# Patient Record
Sex: FEMALE | Race: WHITE | NOT HISPANIC OR LATINO | Employment: UNEMPLOYED | URBAN - METROPOLITAN AREA
[De-identification: names, ages, dates, MRNs, and addresses within clinical notes are randomized per-mention and may not be internally consistent; named-entity substitution may affect disease eponyms.]

---

## 2021-08-19 ENCOUNTER — HISTORICAL (OUTPATIENT)
Dept: ADMINISTRATIVE | Facility: HOSPITAL | Age: 21
End: 2021-08-19

## 2022-10-12 PROBLEM — Z30.2 ADMISSION FOR STERILIZATION: Status: ACTIVE | Noted: 2022-10-12

## 2022-10-12 RX ORDER — LORATADINE 10 MG/1
10 TABLET ORAL DAILY
COMMUNITY
End: 2023-10-31

## 2022-10-12 NOTE — H&P
History & Physical  Gynecology    SUBJECTIVE:     Chief Complaint/Reason for Admission: Laparoscopic Bilateral Tubal Fulguration    History of Present Illness:  Patient is a 22 y.o.  who presents with undesired future fertility and desires permanent sterilization.     No medications prior to admission.       Review of patient's allergies indicates:  Not on File    Past medical history:elevated BMI and depression    Past surgical history:, T&A, and left patellar repair.        Review of Systems:  Constitutional: no fever or chills  Respiratory: no cough or shortness of breath  Cardiovascular: no chest pain or palpitations  Genitourinary: no hematuria or dysuria  Neuro:No headaches, dizziness or blurry vision.     OBJECTIVE:     Vital Signs (Most Recent):       Physical Exam:  General: well developed, well nourished  Lungs:  clear to auscultation bilaterally and normal respiratory effort  Cardiovascular: Heart: regular rate and rhythm. Chest Wall: no tenderness. ABD: obese w/o masses. Extremities: no cyanosis or edema, or clubbing. Pulses: 2+ and symmetric. Neuro: cranial nerves 2-12 grossly intact.  Pelvic:   NFEG w/o lesions. Small mobile uterus, no adx masses    Laboratory:  No results found for: WBC, HGB, HCT, MCV, PLT    Ultrasound:  No results found for this or any previous visit.          ASSESSMENT/PLAN:     Active Hospital Problems    Diagnosis  POA    Admission for sterilization [Z30.2]  Not Applicable      Resolved Hospital Problems   No resolved problems to display.       To OR for LBTF    Preop labs and studies ordered.    NPO 8 hours prior to surgery.     Medications reviewed.     Risks and benefits explained in detail to patient, including but not limited to damage to internal organs, including but not limited to bowel, bladder, uterus, tubes, ovaries, nerves, arteries, veins, possible need for blood transfusion. Patient is aware of all risks and desires surgery. All questions  answered. Consents signed.

## 2022-10-13 ENCOUNTER — APPOINTMENT (OUTPATIENT)
Dept: LAB | Facility: HOSPITAL | Age: 22
End: 2022-10-13
Attending: OBSTETRICS & GYNECOLOGY
Payer: MEDICAID

## 2022-10-13 DIAGNOSIS — Z01.818 PRE-OP TESTING: ICD-10-CM

## 2022-10-13 LAB — SARS-COV-2 RNA RESP QL NAA+PROBE: NOT DETECTED

## 2022-10-13 PROCEDURE — 87635 SARS-COV-2 COVID-19 AMP PRB: CPT

## 2022-10-15 RX ORDER — HYDROCODONE BITARTRATE AND ACETAMINOPHEN 10; 325 MG/1; MG/1
1 TABLET ORAL EVERY 6 HOURS PRN
Status: CANCELLED | OUTPATIENT
Start: 2022-10-17

## 2022-10-15 RX ORDER — HYDROCODONE BITARTRATE AND ACETAMINOPHEN 5; 325 MG/1; MG/1
1 TABLET ORAL EVERY 6 HOURS PRN
Status: CANCELLED | OUTPATIENT
Start: 2022-10-17

## 2022-10-15 RX ORDER — PROMETHAZINE HYDROCHLORIDE 25 MG/1
25 TABLET ORAL EVERY 6 HOURS PRN
Status: CANCELLED | OUTPATIENT
Start: 2022-10-17

## 2022-10-15 RX ORDER — ONDANSETRON 4 MG/1
4 TABLET, FILM COATED ORAL EVERY 6 HOURS PRN
Status: CANCELLED | OUTPATIENT
Start: 2022-10-17

## 2022-10-16 ENCOUNTER — ANESTHESIA EVENT (OUTPATIENT)
Dept: SURGERY | Facility: HOSPITAL | Age: 22
End: 2022-10-16
Payer: MEDICAID

## 2022-10-17 ENCOUNTER — HOSPITAL ENCOUNTER (OUTPATIENT)
Facility: HOSPITAL | Age: 22
Discharge: HOME | End: 2022-10-17
Attending: OBSTETRICS & GYNECOLOGY | Admitting: OBSTETRICS & GYNECOLOGY
Payer: MEDICAID

## 2022-10-17 ENCOUNTER — ANESTHESIA (OUTPATIENT)
Dept: SURGERY | Facility: HOSPITAL | Age: 22
End: 2022-10-17
Payer: MEDICAID

## 2022-10-17 DIAGNOSIS — Z30.2 ADMISSION FOR STERILIZATION: Primary | ICD-10-CM

## 2022-10-17 LAB
B-HCG UR QL: NEGATIVE
BASOPHILS # BLD AUTO: 0.05 X10(3)/MCL (ref 0–0.2)
BASOPHILS NFR BLD AUTO: 0.7 %
CTP QC/QA: YES
EOSINOPHIL # BLD AUTO: 0.13 X10(3)/MCL (ref 0–0.9)
EOSINOPHIL NFR BLD AUTO: 1.9 %
ERYTHROCYTE [DISTWIDTH] IN BLOOD BY AUTOMATED COUNT: 12.9 % (ref 11.5–17)
HCT VFR BLD AUTO: 42.6 % (ref 37–47)
HGB BLD-MCNC: 13.6 GM/DL (ref 12–16)
IMM GRANULOCYTES # BLD AUTO: 0.01 X10(3)/MCL (ref 0–0.04)
IMM GRANULOCYTES NFR BLD AUTO: 0.1 %
LYMPHOCYTES # BLD AUTO: 2.23 X10(3)/MCL (ref 0.6–4.6)
LYMPHOCYTES NFR BLD AUTO: 32 %
MCH RBC QN AUTO: 28.9 PG (ref 27–31)
MCHC RBC AUTO-ENTMCNC: 31.9 MG/DL (ref 33–36)
MCV RBC AUTO: 90.6 FL (ref 80–94)
MONOCYTES # BLD AUTO: 0.49 X10(3)/MCL (ref 0.1–1.3)
MONOCYTES NFR BLD AUTO: 7 %
NEUTROPHILS # BLD AUTO: 4.1 X10(3)/MCL (ref 2.1–9.2)
NEUTROPHILS NFR BLD AUTO: 58.3 %
NRBC BLD AUTO-RTO: 0 %
PLATELET # BLD AUTO: 293 X10(3)/MCL (ref 130–400)
PMV BLD AUTO: 9.7 FL (ref 7.4–10.4)
RBC # BLD AUTO: 4.7 X10(6)/MCL (ref 4.2–5.4)
WBC # SPEC AUTO: 7 X10(3)/MCL (ref 4.5–11.5)

## 2022-10-17 PROCEDURE — 36000709 HC OR TIME LEV III EA ADD 15 MIN: Performed by: OBSTETRICS & GYNECOLOGY

## 2022-10-17 PROCEDURE — 71000016 HC POSTOP RECOV ADDL HR: Performed by: OBSTETRICS & GYNECOLOGY

## 2022-10-17 PROCEDURE — 63600175 PHARM REV CODE 636 W HCPCS: Performed by: ANESTHESIOLOGY

## 2022-10-17 PROCEDURE — 71000015 HC POSTOP RECOV 1ST HR: Performed by: OBSTETRICS & GYNECOLOGY

## 2022-10-17 PROCEDURE — 36000708 HC OR TIME LEV III 1ST 15 MIN: Performed by: OBSTETRICS & GYNECOLOGY

## 2022-10-17 PROCEDURE — 37000008 HC ANESTHESIA 1ST 15 MINUTES: Performed by: OBSTETRICS & GYNECOLOGY

## 2022-10-17 PROCEDURE — 71000033 HC RECOVERY, INTIAL HOUR: Performed by: OBSTETRICS & GYNECOLOGY

## 2022-10-17 PROCEDURE — 25000003 PHARM REV CODE 250: Performed by: OBSTETRICS & GYNECOLOGY

## 2022-10-17 PROCEDURE — 81025 URINE PREGNANCY TEST: CPT | Performed by: OBSTETRICS & GYNECOLOGY

## 2022-10-17 PROCEDURE — 36415 COLL VENOUS BLD VENIPUNCTURE: CPT | Performed by: OBSTETRICS & GYNECOLOGY

## 2022-10-17 PROCEDURE — 25000003 PHARM REV CODE 250: Performed by: NURSE ANESTHETIST, CERTIFIED REGISTERED

## 2022-10-17 PROCEDURE — 37000009 HC ANESTHESIA EA ADD 15 MINS: Performed by: OBSTETRICS & GYNECOLOGY

## 2022-10-17 PROCEDURE — 25000003 PHARM REV CODE 250: Performed by: ANESTHESIOLOGY

## 2022-10-17 PROCEDURE — 00851 ANES IPER PX TUBAL LIGATION: CPT | Performed by: OBSTETRICS & GYNECOLOGY

## 2022-10-17 PROCEDURE — 63600175 PHARM REV CODE 636 W HCPCS: Performed by: NURSE ANESTHETIST, CERTIFIED REGISTERED

## 2022-10-17 RX ORDER — DEXAMETHASONE SODIUM PHOSPHATE 4 MG/ML
INJECTION, SOLUTION INTRA-ARTICULAR; INTRALESIONAL; INTRAMUSCULAR; INTRAVENOUS; SOFT TISSUE
Status: DISCONTINUED | OUTPATIENT
Start: 2022-10-17 | End: 2022-10-17

## 2022-10-17 RX ORDER — ONDANSETRON 2 MG/ML
4 INJECTION INTRAMUSCULAR; INTRAVENOUS ONCE AS NEEDED
Status: COMPLETED | OUTPATIENT
Start: 2022-10-17 | End: 2022-10-17

## 2022-10-17 RX ORDER — HYDROCODONE BITARTRATE AND ACETAMINOPHEN 5; 325 MG/1; MG/1
1 TABLET ORAL EVERY 6 HOURS PRN
Qty: 5 TABLET | Refills: 0 | Status: SHIPPED | OUTPATIENT
Start: 2022-10-17 | End: 2023-10-31

## 2022-10-17 RX ORDER — HYDROCODONE BITARTRATE AND ACETAMINOPHEN 5; 325 MG/1; MG/1
1 TABLET ORAL EVERY 4 HOURS PRN
Status: CANCELLED | OUTPATIENT
Start: 2022-10-17

## 2022-10-17 RX ORDER — METHOCARBAMOL 100 MG/ML
INJECTION, SOLUTION INTRAMUSCULAR; INTRAVENOUS
Status: DISCONTINUED
Start: 2022-10-17 | End: 2022-10-17 | Stop reason: HOSPADM

## 2022-10-17 RX ORDER — ACETAMINOPHEN 500 MG
1000 TABLET ORAL
Status: COMPLETED | OUTPATIENT
Start: 2022-10-17 | End: 2022-10-17

## 2022-10-17 RX ORDER — HYDROMORPHONE HYDROCHLORIDE 2 MG/ML
1 INJECTION, SOLUTION INTRAMUSCULAR; INTRAVENOUS; SUBCUTANEOUS EVERY 6 HOURS PRN
Status: CANCELLED | OUTPATIENT
Start: 2022-10-17

## 2022-10-17 RX ORDER — PROCHLORPERAZINE EDISYLATE 5 MG/ML
5 INJECTION INTRAMUSCULAR; INTRAVENOUS EVERY 6 HOURS PRN
Status: DISCONTINUED | OUTPATIENT
Start: 2022-10-17 | End: 2022-10-17 | Stop reason: HOSPADM

## 2022-10-17 RX ORDER — ROCURONIUM BROMIDE 10 MG/ML
INJECTION, SOLUTION INTRAVENOUS
Status: DISCONTINUED | OUTPATIENT
Start: 2022-10-17 | End: 2022-10-17

## 2022-10-17 RX ORDER — GABAPENTIN 300 MG/1
600 CAPSULE ORAL
Status: COMPLETED | OUTPATIENT
Start: 2022-10-17 | End: 2022-10-17

## 2022-10-17 RX ORDER — FENTANYL CITRATE 50 UG/ML
INJECTION, SOLUTION INTRAMUSCULAR; INTRAVENOUS
Status: DISCONTINUED | OUTPATIENT
Start: 2022-10-17 | End: 2022-10-17

## 2022-10-17 RX ORDER — LIDOCAINE HYDROCHLORIDE 10 MG/ML
INJECTION, SOLUTION EPIDURAL; INFILTRATION; INTRACAUDAL; PERINEURAL
Status: DISCONTINUED | OUTPATIENT
Start: 2022-10-17 | End: 2022-10-17

## 2022-10-17 RX ORDER — LIDOCAINE HYDROCHLORIDE 10 MG/ML
1 INJECTION, SOLUTION EPIDURAL; INFILTRATION; INTRACAUDAL; PERINEURAL ONCE
Status: DISCONTINUED | OUTPATIENT
Start: 2022-10-17 | End: 2022-10-17 | Stop reason: HOSPADM

## 2022-10-17 RX ORDER — ACETAMINOPHEN 325 MG/1
650 TABLET ORAL EVERY 4 HOURS PRN
Status: CANCELLED | OUTPATIENT
Start: 2022-10-17

## 2022-10-17 RX ORDER — HYDROMORPHONE HYDROCHLORIDE 2 MG/ML
0.2 INJECTION, SOLUTION INTRAMUSCULAR; INTRAVENOUS; SUBCUTANEOUS EVERY 5 MIN PRN
Status: DISCONTINUED | OUTPATIENT
Start: 2022-10-17 | End: 2022-10-17 | Stop reason: HOSPADM

## 2022-10-17 RX ORDER — IBUPROFEN 800 MG/1
800 TABLET ORAL EVERY 8 HOURS PRN
Qty: 30 TABLET | Refills: 2 | Status: SHIPPED | OUTPATIENT
Start: 2022-10-17

## 2022-10-17 RX ORDER — CELECOXIB 200 MG/1
200 CAPSULE ORAL
Status: COMPLETED | OUTPATIENT
Start: 2022-10-17 | End: 2022-10-17

## 2022-10-17 RX ORDER — MIDAZOLAM HYDROCHLORIDE 1 MG/ML
2 INJECTION INTRAMUSCULAR; INTRAVENOUS ONCE AS NEEDED
Status: COMPLETED | OUTPATIENT
Start: 2022-10-17 | End: 2022-10-17

## 2022-10-17 RX ORDER — ONDANSETRON 4 MG/1
8 TABLET, ORALLY DISINTEGRATING ORAL EVERY 8 HOURS PRN
Status: DISCONTINUED | OUTPATIENT
Start: 2022-10-17 | End: 2022-10-17 | Stop reason: HOSPADM

## 2022-10-17 RX ORDER — BUPIVACAINE HYDROCHLORIDE AND EPINEPHRINE 5; 5 MG/ML; UG/ML
INJECTION, SOLUTION EPIDURAL; INTRACAUDAL; PERINEURAL
Status: DISCONTINUED
Start: 2022-10-17 | End: 2022-10-17 | Stop reason: HOSPADM

## 2022-10-17 RX ORDER — METHOCARBAMOL 100 MG/ML
1000 INJECTION, SOLUTION INTRAMUSCULAR; INTRAVENOUS ONCE
Status: COMPLETED | OUTPATIENT
Start: 2022-10-17 | End: 2022-10-17

## 2022-10-17 RX ORDER — LIDOCAINE HYDROCHLORIDE 10 MG/ML
1 INJECTION, SOLUTION EPIDURAL; INFILTRATION; INTRACAUDAL; PERINEURAL ONCE AS NEEDED
Status: DISCONTINUED | OUTPATIENT
Start: 2022-10-17 | End: 2022-10-17 | Stop reason: HOSPADM

## 2022-10-17 RX ORDER — SODIUM CHLORIDE 9 MG/ML
INJECTION, SOLUTION INTRAVENOUS CONTINUOUS
Status: DISCONTINUED | OUTPATIENT
Start: 2022-10-17 | End: 2022-10-17 | Stop reason: HOSPADM

## 2022-10-17 RX ORDER — PROCHLORPERAZINE EDISYLATE 5 MG/ML
5 INJECTION INTRAMUSCULAR; INTRAVENOUS EVERY 30 MIN PRN
Status: DISCONTINUED | OUTPATIENT
Start: 2022-10-17 | End: 2022-10-17 | Stop reason: HOSPADM

## 2022-10-17 RX ORDER — HYDROCODONE BITARTRATE AND ACETAMINOPHEN 10; 325 MG/1; MG/1
1 TABLET ORAL EVERY 4 HOURS PRN
Status: CANCELLED | OUTPATIENT
Start: 2022-10-17

## 2022-10-17 RX ORDER — ONDANSETRON 2 MG/ML
4 INJECTION INTRAMUSCULAR; INTRAVENOUS
Status: COMPLETED | OUTPATIENT
Start: 2022-10-17 | End: 2022-10-17

## 2022-10-17 RX ORDER — PROPOFOL 10 MG/ML
VIAL (ML) INTRAVENOUS
Status: DISCONTINUED | OUTPATIENT
Start: 2022-10-17 | End: 2022-10-17

## 2022-10-17 RX ORDER — ONDANSETRON 2 MG/ML
INJECTION INTRAMUSCULAR; INTRAVENOUS
Status: DISCONTINUED
Start: 2022-10-17 | End: 2022-10-17 | Stop reason: HOSPADM

## 2022-10-17 RX ORDER — HYDROCODONE BITARTRATE AND ACETAMINOPHEN 5; 325 MG/1; MG/1
1 TABLET ORAL ONCE
Status: COMPLETED | OUTPATIENT
Start: 2022-10-17 | End: 2022-10-17

## 2022-10-17 RX ADMIN — GABAPENTIN 600 MG: 300 CAPSULE ORAL at 06:10

## 2022-10-17 RX ADMIN — ONDANSETRON 4 MG: 2 INJECTION INTRAMUSCULAR; INTRAVENOUS at 06:10

## 2022-10-17 RX ADMIN — HYDROMORPHONE HYDROCHLORIDE 0.2 MG: 2 INJECTION INTRAMUSCULAR; INTRAVENOUS; SUBCUTANEOUS at 08:10

## 2022-10-17 RX ADMIN — SODIUM CHLORIDE, POTASSIUM CHLORIDE, SODIUM LACTATE AND CALCIUM CHLORIDE: 600; 310; 30; 20 INJECTION, SOLUTION INTRAVENOUS at 07:10

## 2022-10-17 RX ADMIN — CELECOXIB 200 MG: 200 CAPSULE ORAL at 06:10

## 2022-10-17 RX ADMIN — MIDAZOLAM 2 MG: 1 INJECTION INTRAMUSCULAR; INTRAVENOUS at 06:10

## 2022-10-17 RX ADMIN — ROCURONIUM BROMIDE 40 MG: 10 SOLUTION INTRAVENOUS at 07:10

## 2022-10-17 RX ADMIN — FENTANYL CITRATE 100 MCG: 50 INJECTION, SOLUTION INTRAMUSCULAR; INTRAVENOUS at 07:10

## 2022-10-17 RX ADMIN — SUGAMMADEX 200 MG: 100 INJECTION, SOLUTION INTRAVENOUS at 07:10

## 2022-10-17 RX ADMIN — HYDROCODONE BITARTRATE AND ACETAMINOPHEN 1 TABLET: 5; 325 TABLET ORAL at 11:10

## 2022-10-17 RX ADMIN — METHOCARBAMOL 1000 MG: 100 INJECTION, SOLUTION INTRAMUSCULAR; INTRAVENOUS at 08:10

## 2022-10-17 RX ADMIN — DEXAMETHASONE SODIUM PHOSPHATE 4 MG: 4 INJECTION, SOLUTION INTRA-ARTICULAR; INTRALESIONAL; INTRAMUSCULAR; INTRAVENOUS; SOFT TISSUE at 07:10

## 2022-10-17 RX ADMIN — ONDANSETRON 4 MG: 2 INJECTION INTRAMUSCULAR; INTRAVENOUS at 08:10

## 2022-10-17 RX ADMIN — LIDOCAINE HYDROCHLORIDE 40 MG: 10 INJECTION, SOLUTION EPIDURAL; INFILTRATION; INTRACAUDAL; PERINEURAL at 07:10

## 2022-10-17 RX ADMIN — ACETAMINOPHEN 1000 MG: 500 TABLET ORAL at 06:10

## 2022-10-17 RX ADMIN — PROPOFOL 200 MG: 10 INJECTION, EMULSION INTRAVENOUS at 07:10

## 2022-10-17 NOTE — ANESTHESIA POSTPROCEDURE EVALUATION
Anesthesia Post Evaluation    Patient: Bethany Garvey    Procedure(s) Performed: Procedure(s) (LRB):  LIGATION, FALLOPIAN TUBE, LAPAROSCOPIC Lap BTL (Bilateral)    Final Anesthesia Type: general      Patient location during evaluation: OPS  Patient participation: Yes- Able to Participate  Level of consciousness: awake and oriented  Post-procedure vital signs: reviewed and stable  Pain management: adequate  Airway patency: patent    PONV status at discharge: No PONV  Anesthetic complications: no      Cardiovascular status: hemodynamically stable  Respiratory status: unassisted and spontaneous ventilation  Hydration status: euvolemic  Follow-up not needed.          Vitals Value Taken Time   /78 10/17/22 1145   Temp 36.8 °C (98.2 °F) 10/17/22 0848   Pulse 95 10/17/22 1145   Resp 18 10/17/22 1118   SpO2 98 % 10/17/22 1145         Event Time   Out of Recovery 08:43:00         Pain/Gume Score: Pain Rating Prior to Med Admin: 6 (10/17/2022 11:18 AM)  Pain Rating Post Med Admin: 0 (10/17/2022  8:40 AM)  Gume Score: 10 (10/17/2022 10:17 AM)

## 2022-10-17 NOTE — DISCHARGE SUMMARY
[facility]  Discharge Note/Brief Operative Note     SUMMARY     Surgery Date: 10/17/2022     Surgeon(s) and Role:     * Kayden Singh DO - Primary    Assisting Surgeon: None    Pre-op Diagnosis:  Sterilization [Z30.2]    Post-op Diagnosis:  Post-Op Diagnosis Codes:     * Sterilization [Z30.2]    Procedure(s) (LRB):  LIGATION, FALLOPIAN TUBE, LAPAROSCOPIC Lap BTL (Bilateral)    Anesthesia: General    Findings/Key Components:  1    Estimated Blood Loss: 1cc         Specimens:   Specimen (24h ago, onward)      None            Discharge Note    SUMMARY     Admit Date: 10/17/2022    Discharge Date and Time: No discharge date for patient encounter.    Attending Physician: Kayden Singh DO     Discharge Provider: Kayden Singh    Final Diagnosis: Post-Op Diagnosis Codes:     * Sterilization [Z30.2]    Disposition: Patient tolerated the procedure well. To Home or Self Care, discharged in good condition    Discharge meds: Ibuprofen 800gm and Norco 5/325, see printed scripts.    Follow Up/Patient Instructions:    Follow-up Information       Kayden Singh DO Follow up in 2 week(s).    Specialty: Obstetrics and Gynecology  Why: already scheduled  Contact information:  [address]  [address]  [place] [address]  [phone]                          Follow up in office at scheduled post op visit. Pelvic rest for 2 weeks.     Medications:  Reconciled Home Medications:   Current Discharge Medication List        START taking these medications    Details   HYDROcodone-acetaminophen (NORCO) 5-325 mg per tablet Take 1 tablet by mouth every 6 (six) hours as needed for Pain (not relieved by Ibuprofen).  Qty: 5 tablet, Refills: 0    Comments: Quantity prescribed more than 7 day supply? No      ibuprofen (ADVIL,MOTRIN) 800 MG tablet Take 1 tablet (800 mg total) by mouth every 8 (eight) hours as needed for Pain (Pain and cramping).  Qty: 30 tablet, Refills: 2            CONTINUE these medications which have NOT CHANGED    Details   citalopram hydrobromide (CELEXA ORAL) Take 60 mg by mouth once daily.      loratadine (CLARITIN) 10 mg tablet Take 10 mg by mouth once daily.           Discharge Procedure Orders   Diet general     Pelvic Rest   Order Comments: For two weeks     Call MD for:  temperature >100.4     Call MD for:  persistent nausea and vomiting     Call MD for:  severe uncontrolled pain     Call MD for:  difficulty breathing, headache or visual disturbances     Call MD for:  redness, tenderness, or signs of infection (pain, swelling, redness, odor or green/yellow discharge around incision site)     Call MD for:  hives     Call MD for:  persistent dizziness or light-headedness     Activity as tolerated

## 2022-10-17 NOTE — CARE UPDATE
Patient awakened,matthews,rejected oral airway, exchanging well, c/o of pain, will medicate per anesthesia orders

## 2022-10-17 NOTE — BRIEF OP NOTE
[facility] Surgical - Periop Services  Brief Operative Note    Surgery Date: 10/17/2022     Surgeon(s) and Role:     * Kayden Singh, DO - Primary    Assisting Surgeon: None    Pre-op Diagnosis:  Sterilization [Z30.2]    Post-op Diagnosis:  Post-Op Diagnosis Codes:     * Sterilization [Z30.2]    Procedure(s) (LRB):  LIGATION, FALLOPIAN TUBE, LAPAROSCOPIC Lap BTL (Bilateral)    Anesthesia: General    Operative Findings: normal pelvic anatomy    Estimated Blood Loss: * No values recorded between 10/17/2022  7:27 AM and 10/17/2022  7:47 AM *         Specimens:   Specimen (24h ago, onward)      None              Discharge Note    OUTCOME: Patient tolerated treatment/procedure well without complication and is now ready for discharge.    DISPOSITION: Home or Self Care    FINAL DIAGNOSIS:  <principal problem not specified>    FOLLOWUP: In clinic    DISCHARGE INSTRUCTIONS:    Discharge Procedure Orders   Diet general     Pelvic Rest   Order Comments: For two weeks     Call MD for:  temperature >100.4     Call MD for:  persistent nausea and vomiting     Call MD for:  severe uncontrolled pain     Call MD for:  difficulty breathing, headache or visual disturbances     Call MD for:  redness, tenderness, or signs of infection (pain, swelling, redness, odor or green/yellow discharge around incision site)     Call MD for:  hives     Call MD for:  persistent dizziness or light-headedness     Activity as tolerated

## 2022-10-17 NOTE — CARE UPDATE
Received patient from the OR, she is asleep, oral airway in place, chin lift maintained, respirations full-regular-deep-clear,hob up 30 degrees.

## 2022-10-17 NOTE — ANESTHESIA PROCEDURE NOTES
Intubation    Date/Time: 10/17/2022 7:18 AM  Performed by: Jenny Frye CRNA  Authorized by: Faheem Dubois MD     Intubation:     Induction:  Intravenous    Intubated:  Postinduction    Mask Ventilation:  Easy mask    Attempts:  1    Attempted By:  CRNA    Method of Intubation:  Direct    Blade:  Weiss 2    Laryngeal View Grade: Grade I - full view of cords      Difficult Airway Encountered?: No      Complications:  None    Airway Device:  Oral endotracheal tube    Airway Device Size:  7.0    Style/Cuff Inflation:  Cuffed (inflated to minimal occlusive pressure)    Tube secured:  21    Secured at:  The lips    Placement Verified By:  Capnometry    Complicating Factors:  None    Findings Post-Intubation:  BS equal bilateral

## 2022-10-17 NOTE — ANESTHESIA PREPROCEDURE EVALUATION
10/16/2022  Bethany Garvey is a 22 y.o., female , who presents for the following:    Procedure: LIGATION, FALLOPIAN TUBE, LAPAROSCOPIC Lap BTL (Bilateral: Abdomen)   Anesthesia type: General   Diagnosis: Sterilization [Z30.2]   Pre-op diagnosis: Sterilization [Z30.2]   Location: [facility] OR  / [facility] OR   Surgeons: Kayden Singh, DO       Pre-op Assessment    I have reviewed the Patient Summary Reports.    I have reviewed the NPO Status.   I have reviewed the Medications.     Review of Systems  Anesthesia Hx:  No problems with previous Anesthesia    Social:  Non-Smoker    Cardiovascular:  Cardiovascular Normal     Pulmonary:  Pulmonary Normal    Hepatic/GI:  Hepatic/GI Normal    Endocrine:  Endocrine Normal    Psych:   anxiety depression ADHD         Physical Exam  General: Alert and Oriented  Obese  Airway:  Mallampati: III   Mouth Opening: Normal  TM Distance: 4 - 6 cm  Tongue: Normal, Large  Neck ROM: Normal ROM    Dental:  Intact    Chest/Lungs:  Normal Respiratory Rate    Heart:  Rate: Normal  Rhythm: Regular Rhythm        Anesthesia Plan  Type of Anesthesia, risks & benefits discussed:    Anesthesia Type: Gen ETT  Intra-op Monitoring Plan: Standard ASA Monitors  Post Op Pain Control Plan: IV/PO Opioids PRN and multimodal analgesia  Induction:  IV  Airway Plan: Direct  Informed Consent: Informed consent signed with the Patient and all parties understand the risks and agree with anesthesia plan.  All questions answered. Patient consented to blood products? Yes  ASA Score: 2  Day of Surgery Review of History & Physical: H&P Update referred to the surgeon/provider.H&P completed by Anesthesiologist.  Anesthesia Plan Notes: ERAS / Multiple Antiemetics    Ready For Surgery From Anesthesia Perspective.     .

## 2022-10-17 NOTE — DISCHARGE INSTRUCTIONS
Patient Education        Fallopian Tube Removal Discharge Instructions         What care is needed at home?   You have steri-strips. You may wash your incision with soap and water. Be sure to wash your hands before and after touching your wound or dressing. The steri-strips may fall off on there on within 1-2 weeks. You do not have to peel them off.   You may take showers 24 hours after your surgery. You should wash between your legs with soap and water very well to reduce your chance of infection.   Do not lift anything over 10 pounds (4.5 kg). Avoid activities like heavy lifting and hard exercise.   Do not use tampons, douche, or have sex for 2 weeks following your surgery or until told so by your doctor.  You can expect some bleeding from your vagina for a few weeks. You may use sanitary pads but not tampons.  Your bowel movements may take some time to get back to normal. Eat small meals high in fiber to avoid hard stools. Drink 6 to 8 glasses of water each day.  Try to walk each day. Start by walking a little more than you did the day before. Walking boosts blood flow and helps prevent lung, belly, and blood problems.  Talk with your doctor about safe sex as you can still be exposed to sexually transmitted diseases.  Use a small pillow to put pressure on your belly. The pressure can make you more comfortable when you cough, laugh, or do other actions.     What follow-up care is needed?   Be sure to keep your follow up visit.     Will physical activity be limited?   Rest for the first few days after the procedure. Talk to your doctor about the right amount of activity for you.    What problems could happen?   Infertility if both fallopian tubes were removed  Infection  Wound opening  Bleeding  Blood clots in your legs or lungs  Injury to nearby organs  Risk for an ectopic pregnancy    When do I need to call the doctor?   Signs of infection such as a fever of 100.4°F (38°C) or higher, chills, pain with passing  urine, wound that will not heal, or anal itching  Signs of wound infection such as swelling, redness, warmth around the wound; too much pain when touched; yellowish, greenish, or bloody discharge; foul smell coming from the cut site; cut site opens up  Lots of blood in your sanitary pads or more than 6 soaked pads per day  Upset stomach, throwing up, or very bad belly pain  No bowel movement after 3 days  You feel the need to pass urine but it will not come out even after 6 hours  Smelly, green, or dark yellow vaginal discharge  Feeling short of breath  Pain or swelling in one or both legs

## 2022-10-17 NOTE — TRANSFER OF CARE
"Anesthesia Transfer of Care Note    Patient: Bethany Garvey    Procedure(s) Performed: Procedure(s) (LRB):  LIGATION, FALLOPIAN TUBE, LAPAROSCOPIC Lap BTL (Bilateral)    Patient location: PACU    Anesthesia Type: general    Transport from OR: Transported from OR on room air with adequate spontaneous ventilation    Post pain: adequate analgesia    Post assessment: no apparent anesthetic complications    Post vital signs: stable    Level of consciousness: responds to stimulation    Nausea/Vomiting: no nausea/vomiting    Complications: none    Transfer of care protocol was followed      Last vitals:   Visit Vitals  /76   Pulse 96   Temp 36.9 °C (98.4 °F) (Oral)   Resp 18   Ht 5' 6" (1.676 m)   Wt 109.5 kg (241 lb 6.5 oz)   LMP 09/14/2022 (Approximate)   SpO2 97%   Breastfeeding Yes   BMI 38.96 kg/m²     "

## 2022-10-17 NOTE — OP NOTE
OPERATIVE NOTE       SUMMARY      Surgery Date: 10/17/2022      SURGEON: Kayden Singh    ASSISTANT: Staff Scrub    PREOP DIAGNOSIS:  21yo  with Undesired future fertility and desires permanent sterilization.    POSTOP DIAGNOSIS:  Same as above    PROCEDURES:  Laparoscopic bilateral tubal fulguration    ANESTHESIA:  GETA    FINDINGS/KEY COMPONENTS: Normal pelvic anatomy    PROCEDURE IN DETAIL:  After ensuring an informed consent the patient was taken to the operating room where general anesthesia was administered.  She was placed in a dorsal lithotomy position employing the adjustable Abdoulaye stirrups.  She was prepped and draped in the usual sterile fashion.  Bladder was drained of urine with a flexible catheter.  Time-out was completed.  A sponge stick was placed in the vagina and attention was turned to the infraumbilical region where a 5 mm vertical skin incision was made within the umbilical fold and a 5 mm trocar and port were placed under direct visualization using the [employer] bladeless system.  No evidence of entry damage noted.  Pneumoperitoneum was obtained with CO2 gas to maximum pressure of 15 mmHg.  A 2nd port was placed in the midline just above the symphysis pubis it was placed under direct visualization with no evidence of entry damage noted.  The above findings were noted.  Both incision sites were pre prepped with 1.5 cc of 0.5% Marcaine with epinephrine.  Attention was turned to the left fallopian tube and the Kleppinger was used to fulgurate the tube to complete desiccation in 3 contiguous spots started in the mid ampullary region going towards the fimbriated end.  No viable tissue noted in between the burn sites.  Excellent hemostasis was appreciated.  The same procedure was carried out on the contralateral side without difficulty.  At this time all instruments removed and the CO2 gas was allowed to escape.  The trocars were removed and the incisions were reapproximated using 4-O  Monocryl in a subcuticular fashion and then covered with a thin layer of Dermabond.  The sponge stick was removed from the vagina and the patient was cleansed of all blood and Betadine, taken out of dorsal lithotomy position awoken from anesthesia and taken to recovery room in stable condition.  Lap sponge instrument needle counts were correct x3.    ESTIMATED BLOOD LOSS: 1cc    Fluids:1000cc    COMPLICATIONS: none    PATHOLOGY:   Specimens (From admission, onward)      None

## 2022-10-20 VITALS
RESPIRATION RATE: 18 BRPM | HEIGHT: 66 IN | OXYGEN SATURATION: 98 % | BODY MASS INDEX: 38.79 KG/M2 | SYSTOLIC BLOOD PRESSURE: 125 MMHG | DIASTOLIC BLOOD PRESSURE: 78 MMHG | TEMPERATURE: 98 F | HEART RATE: 95 BPM | WEIGHT: 241.38 LBS

## 2022-12-02 ENCOUNTER — HOSPITAL ENCOUNTER (EMERGENCY)
Facility: HOSPITAL | Age: 22
Discharge: HOME | End: 2022-12-03
Attending: EMERGENCY MEDICINE
Payer: MEDICAID

## 2022-12-02 DIAGNOSIS — R10.13 EPIGASTRIC PAIN: Primary | ICD-10-CM

## 2022-12-02 DIAGNOSIS — K80.20 CALCULUS OF GALLBLADDER WITHOUT CHOLECYSTITIS WITHOUT OBSTRUCTION: ICD-10-CM

## 2022-12-02 DIAGNOSIS — R10.13 EPIGASTRIC ABDOMINAL PAIN: ICD-10-CM

## 2022-12-02 RX ORDER — MORPHINE SULFATE 4 MG/ML
2 INJECTION, SOLUTION INTRAMUSCULAR; INTRAVENOUS ONCE
Status: COMPLETED | OUTPATIENT
Start: 2022-12-03 | End: 2022-12-03

## 2022-12-02 RX ORDER — ONDANSETRON 2 MG/ML
4 INJECTION INTRAMUSCULAR; INTRAVENOUS
Status: COMPLETED | OUTPATIENT
Start: 2022-12-03 | End: 2022-12-03

## 2022-12-03 VITALS
WEIGHT: 235.69 LBS | OXYGEN SATURATION: 99 % | TEMPERATURE: 98 F | SYSTOLIC BLOOD PRESSURE: 121 MMHG | RESPIRATION RATE: 12 BRPM | BODY MASS INDEX: 37.88 KG/M2 | DIASTOLIC BLOOD PRESSURE: 87 MMHG | HEIGHT: 66 IN | HEART RATE: 91 BPM

## 2022-12-03 PROCEDURE — 63600175 PHARM REV CODE 636 W HCPCS: Performed by: PHYSICIAN ASSISTANT

## 2022-12-03 PROCEDURE — 96372 THER/PROPH/DIAG INJ SC/IM: CPT | Performed by: PHYSICIAN ASSISTANT

## 2022-12-03 PROCEDURE — 99284 EMERGENCY DEPT VISIT MOD MDM: CPT

## 2022-12-03 RX ORDER — HYDROCODONE BITARTRATE AND ACETAMINOPHEN 7.5; 325 MG/1; MG/1
1 TABLET ORAL EVERY 6 HOURS PRN
Qty: 15 TABLET | Refills: 0 | Status: SHIPPED | OUTPATIENT
Start: 2022-12-03 | End: 2023-10-31

## 2022-12-03 RX ADMIN — ONDANSETRON 4 MG: 2 INJECTION INTRAMUSCULAR; INTRAVENOUS at 12:12

## 2022-12-03 RX ADMIN — MORPHINE SULFATE 2 MG: 4 INJECTION INTRAVENOUS at 12:12

## 2022-12-03 NOTE — ED PROVIDER NOTES
Encounter Date: 12/2/2022       History     Chief Complaint   Patient presents with    Abdominal Pain     Patient complaint of upper abdominal pain, epigastric pain.  Nauseated.  Was discharged today from [facility] for treatment of same symptoms, placed on protonix, and ultram.  States no relief with ultram. Currently breastfeeding.      Patient is a 22 y.o. female,  female, no significant past medical history presented to the ED with epigastric pain radiating to her chest.  She reports multiple ER visits with similar complaints.  Her last ER visit was earlier today from our lady [facility].   Cardiac work-up was done showed normal findings. She also had a CT scan of the abdomen which was concerning for cholelithiasis/cholecystitis without obstruction. Discussed with general surgery there is no cholecystitis since notes from [facility] from this morning.  She continues with epigastric pain concerned about reflux.  And comes in now to be evaluated for gallstone and epigastric pain.  . Denies any condition denies shortness of breath chest pain denies nausea vomiting diarrhea, her main complaint is  epigastric pain that is not improving with rx medline.  Also patient states she was given a prescription for tramadol which has worsen her epigastric pain more since she took it..        Review of patient's allergies indicates:  No Known Allergies  Past Medical History:   Diagnosis Date    ADHD     Anxiety disorder, unspecified     Depression      Past Surgical History:   Procedure Laterality Date    DILATION AND CURETTAGE OF UTERUS      LAPAROSCOPIC LIGATION OF FALLOPIAN TUBE Bilateral 10/17/2022    Procedure: LIGATION, FALLOPIAN TUBE, LAPAROSCOPIC Lap BTL;  Surgeon: Kayden Singh DO;  Location: [facility];  Service: OB/GYN;  Laterality: Bilateral;    PATELLA REALIGNMENT Left     TONSILLECTOMY      and adenoids     No family history on file.  Social History     Tobacco Use    Smoking status: Never    Smokeless  tobacco: Never   Substance Use Topics    Alcohol use: Never    Drug use: Never     Review of Systems   Constitutional:  Negative for fever.   HENT:  Negative for sore throat.    Respiratory:  Negative for shortness of breath.    Cardiovascular:  Negative for chest pain.   Gastrointestinal:  Positive for abdominal pain and nausea.   Genitourinary:  Negative for dysuria.   Musculoskeletal:  Negative for back pain.   Skin:  Negative for rash.   Neurological:  Negative for weakness.   Hematological:  Does not bruise/bleed easily.     Physical Exam     Initial Vitals [12/02/22 2227]   BP Pulse Resp Temp SpO2   118/78 98 20 97.8 °F (36.6 °C) 99 %      MAP       --         Physical Exam    Vitals reviewed.  Constitutional: She appears well-developed.   Cardiovascular:  Normal rate.           Pulmonary/Chest: Breath sounds normal.   Abdominal: Bowel sounds are normal. There is abdominal tenderness in the right upper quadrant and epigastric area.   No right CVA tenderness.There is no guarding.     Neurological: She is alert and oriented to person, place, and time. She has normal strength.   Skin: Skin is warm.   Psychiatric: Her behavior is normal.       ED Course   Procedures  Labs Reviewed - No data to display       Imaging Results    None          Medications   morphine injection 2 mg (2 mg Intramuscular Given 12/3/22 0013)   ondansetron injection 4 mg (4 mg Intramuscular Given 12/3/22 0013)     Medical Decision Making:   Initial Assessment:   Abdominal pain   Epigastric pain   Differential Diagnosis:    Calculus of gallbladder without cholecystitis without obstruction  Abdominal pain, acute, epigastric    Clinical Tests:   Lab Tests: Reviewed  ED Management:  I have reviewed labs, imaging from a [facility] done 12/02/2022.   Ct scan showed calculus of gallbladder without cholecystitis without obstruction  Patient main pain needs an acute epigastric pain.  Improved with pain medicine here.  Clinical symptoms not  consistent with acute cholecystitis, clinical symptoms consistent with epigastric pain   Continue on Protonix and Carafate for possible gastritis.   Changed tramadol to hydrocodone until further follow-up with gastro.  Gave patient is specific instructions to follow up with a general surgeon to be evaluated for her gallbladder.   Patient is not acute nontoxic-appearing.  Vital signs are stable.  Patient denies being short of breath denies chest pain.  She is not tachypneic or hypoxic.  Workup demonstrated from a [facility] an acute epigastric pain  I provided strict return to ER precautions, patient is aware that she needs to follow up with a general surgeon and a GI doctor.  I will be giving her a referral to Dr. Aman Kinsey for further follow-up.                          Clinical Impression:   Final diagnoses:  [R10.13] Epigastric pain (Primary)  [R10.13] Epigastric abdominal pain  [K80.20] Calculus of gallbladder without cholecystitis without obstruction      ED Disposition Condition    Discharge Stable          ED Prescriptions       Medication Sig Dispense Start Date End Date Auth. Provider    HYDROcodone-acetaminophen (NORCO) 7.5-325 mg per tablet Take 1 tablet by mouth every 6 (six) hours as needed for Pain. 15 tablet 12/3/2022 -- REESE Hunt          Follow-up Information       Follow up With Specialties Details Why Contact Info    [facility]  If symptoms worsen [address] [address]  [phone]             REESE Hunt  12/03/22 0037       REESE Hunt  12/03/22 0041

## 2023-10-10 DIAGNOSIS — G89.29 CHRONIC PAIN OF RIGHT KNEE: Primary | ICD-10-CM

## 2023-10-10 DIAGNOSIS — M25.561 CHRONIC PAIN OF RIGHT KNEE: Primary | ICD-10-CM

## 2023-10-31 ENCOUNTER — HOSPITAL ENCOUNTER (OUTPATIENT)
Dept: RADIOLOGY | Facility: HOSPITAL | Age: 23
Discharge: HOME | End: 2023-10-31
Attending: STUDENT IN AN ORGANIZED HEALTH CARE EDUCATION/TRAINING PROGRAM
Payer: MEDICAID

## 2023-10-31 ENCOUNTER — OFFICE VISIT (OUTPATIENT)
Dept: ORTHOPEDICS | Facility: CLINIC | Age: 23
End: 2023-10-31
Payer: MEDICAID

## 2023-10-31 VITALS
HEART RATE: 111 BPM | DIASTOLIC BLOOD PRESSURE: 83 MMHG | HEIGHT: 66 IN | BODY MASS INDEX: 37.77 KG/M2 | SYSTOLIC BLOOD PRESSURE: 118 MMHG | WEIGHT: 235 LBS

## 2023-10-31 DIAGNOSIS — M25.562 PAIN IN BOTH KNEES, UNSPECIFIED CHRONICITY: ICD-10-CM

## 2023-10-31 DIAGNOSIS — S83.004A DISLOCATION OF RIGHT PATELLA, INITIAL ENCOUNTER: Primary | ICD-10-CM

## 2023-10-31 DIAGNOSIS — M25.561 PAIN IN BOTH KNEES, UNSPECIFIED CHRONICITY: ICD-10-CM

## 2023-10-31 PROCEDURE — 73564 X-RAY EXAM KNEE 4 OR MORE: CPT | Mod: TC,RT

## 2023-10-31 PROCEDURE — 73564 X-RAY EXAM KNEE 4 OR MORE: CPT | Mod: TC,LT

## 2023-10-31 PROCEDURE — 99213 OFFICE O/P EST LOW 20 MIN: CPT | Mod: PBBFAC | Performed by: STUDENT IN AN ORGANIZED HEALTH CARE EDUCATION/TRAINING PROGRAM

## 2023-10-31 RX ORDER — DEXTROAMPHETAMINE SULFATE, DEXTROAMPHETAMINE SACCHARATE, AMPHETAMINE SULFATE AND AMPHETAMINE ASPARTATE 3.75; 3.75; 3.75; 3.75 MG/1; MG/1; MG/1; MG/1
CAPSULE, EXTENDED RELEASE ORAL
COMMUNITY
Start: 2023-10-12

## 2023-10-31 RX ORDER — METHOCARBAMOL 750 MG/1
750 TABLET, FILM COATED ORAL EVERY 8 HOURS PRN
COMMUNITY
Start: 2023-10-11

## 2023-10-31 RX ORDER — DICLOFENAC SODIUM 75 MG/1
75 TABLET, DELAYED RELEASE ORAL EVERY 12 HOURS PRN
COMMUNITY
Start: 2023-10-07 | End: 2023-11-16

## 2023-10-31 NOTE — PROGRESS NOTES
"Subjective:    Patient ID: Bethany Garvey is a 23 y.o. female  who presented to [facility] for new visit.    Chief Complaint: Pain and Swelling of the Right Knee (Pt is here for bilateral knees, both pop out of place randomly. This has been happening since she was around 10 years old. )    History of Present Illness:  Bethany Garvey who has a history of recurrent bilateral patellar dislocations  presented today with with knee pain involving both knees for the past 10+ years. Pain is located on the anterior knee bilaterally. Quality of pain is described as Aching and Throbbing.  Patient states that she gets bilateral patellar dislocations monthly. She has had a left knee surgery in 2006 (unsure what the surgery was / no records are available). Patient has tried PT, braces without any relief of her symptoms. Patient is now desiring surgical eval / management.    Knee Review of Systems:  Swelling?  yes, intermittently after patellar dislocations  Instability?  yes  Mechanical sx?  no  <30 min AM stiffness? no  Limited ROM? no  Fever/Chills? no     Objective:      Physical Exam:  /83   Pulse (!) 111   Ht 5' 6" (1.676 m)   Wt 106.6 kg (235 lb)   BMI 37.93 kg/m²     Appearance:  Normal gait/station  FWB  Alignment: Left: normal Right: normal   Soft tissue swelling: Left: no Right: no  Effusion: Left:  Negative Right: Negative  Erythema: Left no Right: no  Ecchymosis: Left: no Right: no  Atrophy: Left: no Right: no    Palpation:  Knee Tenderness: Left: anterior knee joint and nonspecific generalized tenderness Right: anterior knee joint and nonspecific generalized tenderness    Range of motion:  Flexion (140): Left:  135 Right: 135  Extension (0): Left: 0 Right: 0    Strength:  Extension: Left 5/5  Pain: no     Right 5/5 Pain: no  Flexion: Left 5/5 Pain: no Right   5/5 Pain: no    Special Tests:  Ballotable Effusion:Left: Negative Right: Negative   Fluid Wave: Left: " Negative Right: Negative   Crepitus: Left: Negative Right: Negative   Patellar grind test: Left: Negative  Right: Negative  Apprehension test: Left: Positive Right: Positive   Lachman: Left: Negative Right: Negative   Ant Drawer: Left: Negative Right: Negative   Posterior Drawer: Left: Negative Right: Negative   Ana: Left: Negative Right: Negative     General appearance: NAD  Peripheral pulses: normal bilaterally   Reflexes: Left: normal Right normal   Sensation: normal    Labs:  Last A1c: The patient doesn't have any registry metric data available     Imaging:   Previous images reviewed.  X-rays ordered and performed today: yes  # of views: 4 Laterality: bilateral  My Interpretation:  no fracture, dislocation, swelling or degenerative changes noted     Assessment:        Encounter Diagnoses   Code Name Primary?    S83.004A Dislocation of right patella, initial encounter Yes    M25.561, M25.562 Pain in both knees, unspecified chronicity       Plan:      MDM: Prior external referring provider notes reviewed. Prior external referring provider studies reviewed.   Dx: Recurrent dislocation of the right patella  Treatment Plan:   - Discussed with patient diagnosis, prognosis, and treatment recommendations. Education provided.    - Patient has failed conservative management with PT / HEP / Braces and now desires surgical eval / management  - MRI of the right knee ordered today to further evaluate / pre-surgical planning  - OTC NSAIDS / Tylenol PRN for pain control  Imaging: radiological studies ordered and independently reviewed; discussed with patient; pending radiologist interpretation.   Weight Management: is paramount. recommend at least 10% of total body weight loss if your bmi is 30-34.9. A bmi 24.9 or less may provide further relief..   Activity: Activity as tolerated; HEP to include aerobic conditioning and strength training with non-painful activity. ROM/STG exercises. Proper footware; assistive devises to  avoid limping.   RTC: after imaging test complete.         This note is dictated using the M*Modal Fluency Direct word recognition program. There are word recognition mistakes that are occasionally missed on review.    Smiley Pop MD

## 2023-11-16 ENCOUNTER — OFFICE VISIT (OUTPATIENT)
Dept: ORTHOPEDICS | Facility: CLINIC | Age: 23
End: 2023-11-16
Payer: MEDICAID

## 2023-11-16 VITALS
HEART RATE: 101 BPM | DIASTOLIC BLOOD PRESSURE: 78 MMHG | BODY MASS INDEX: 37.77 KG/M2 | WEIGHT: 235 LBS | SYSTOLIC BLOOD PRESSURE: 109 MMHG | HEIGHT: 66 IN

## 2023-11-16 DIAGNOSIS — S83.004D DISLOCATION OF RIGHT PATELLA, SUBSEQUENT ENCOUNTER: Primary | ICD-10-CM

## 2023-11-16 PROCEDURE — 99213 OFFICE O/P EST LOW 20 MIN: CPT | Mod: PBBFAC

## 2023-11-16 RX ORDER — BUSPIRONE HYDROCHLORIDE 10 MG/1
10 TABLET ORAL
COMMUNITY
Start: 2023-09-01

## 2023-11-16 NOTE — PROGRESS NOTES
"Subjective:    Patient ID: Bethany Garvey is a 23 y.o. female  who presented to [facility] for follow up.    Chief Complaint: Pain of the Right Knee (Here for MRI results on right knee. MRI done on 11/09.)    History of Present Illness:  Bethany Garvey history of chronic bilateral knee dislocations (2-3x per month since 2012) and history of left patella realignment in 2012 presents to the clinic for a follow up visit regarding right knee pain onset 10+ years. She was recently seen here 3 weeks ago desiring surgical eval/management for the right knee and is here to review MRI result of the right knee. Right knee pain is located globally around the knee, 3/10, can exacerbate to a 10/10 with prolonged standing, walking, and going up/down the stairs, and improves with rest, ice, and robaxin/ibuprofen. She has tried PT and braces without any relief of symptoms. Occupation: . She admits her pain is affecting her work and ADL.She denies any injury or dislocations since her last visit.     Knee Review of Systems:  Swelling?  yes  Instability?  yes  Mechanical sx?  no  <30 min AM stiffness? yes  Limited ROM? no  Fever/Chills? no       Objective:      Physical Exam:    /78   Pulse 101   Ht 5' 6" (1.676 m)   Wt 106.6 kg (235 lb)   BMI 37.93 kg/m²     Ortho/SPM Exam    Appearance:  Normal gait/station  FWB  Alignment: Left: moderate valgus and patella [place] Right: moderate valgus and patella [place]  Soft tissue swelling: Left: no Right: no  Effusion: Left:  Negative Right: Negative  Erythema: Left no Right: no  Ecchymosis: Left: no Right: no  Atrophy: Left: no Right: no    Palpation:  Knee Tenderness: Left: Medial joint line and Patella Right: Medial joint line and Patella    Range of motion:  Flexion (140): Left:  120 Right: 120  Extension (0): Left: 10 Right:  20    Strength:  Extension: Left 5/5  Pain: no     Right 4/5 Pain: yes  Flexion: Left 5/5 Pain: no Right  "  4/5 Pain: yes    Special Tests:  Ballotable Effusion:Left: Negative Right: Negative   Fluid Wave: Left: Negative Right: Positive   Crepitus: Left: Negative Right: Negative   Patellar grind test: Left: Negative  Right: Positive  Varus: @ 0, Left Negative Right: Negative.  @ 30, Left Negative  Right Negative   Valgus: @ 0, Left Negative Right: Negative.  @ 30, Left Negative  Right Negative  Lachman: Left: Negative Right: Negative   Ant Drawer: Left: Negative Right: Negative   Posterior Drawer: Left: Negative Right: Negative   Ana: Left: Negative Right: Negative     Beighton Score= 0/9    General appearance: NAD  Peripheral pulses: normal bilaterally   Reflexes: Left: normal Right normal   Sensation: normal    Imaging:   Previous images reviewed.  X-rays ordered and performed today: no  My Interpretation MRI of the right knee shows a lateral subluxation of the patella with a elongated but intact MPFL, TT TG distance borderline a 15 mm.  9 mm diameter full-thickness cartilage defect at the median ridge of the patella, and bone marrow contusion at the lateral edge of the lateral femoral condyle. Patella beata with an Insall-Salvati ratio of 1.7.    Assessment:        Encounter Diagnoses   Code Name Primary?    S83.004D Dislocation of right patella, subsequent encounter Yes        Plan:      Dx: right Knee Dislocation, subsequent encounter.    Treatment Plan: Discussed with patient diagnosis, prognosis, and treatment recommendations. Education provided.    MRI of the right knee was reviewed with the patient. TT-TG distance of 15 mm and a Beighton score of 0/9.  Patient has failed conservative management, including PT and knee braces, and would like to consult ortho for surgery.  Patient was advised to continue exercise program, wearing a knee brace, and taking Tylenol or ibuprofen as needed for pain.  Imaging: prior radiological studies independently reviewed; discussed with patient; agree with radiologist  interpretation.   Activity: Activity as tolerated; HEP to include aerobic conditioning and strength training with non-painful activity. ROM/STG exercises. Proper footware; assistive devises to avoid limping.   Therapy: HEP  Medication: START over-the-counter acetaminophen (Tylenol 1000 mg three times per day as needed)  CONTINUE over-the-counter NSAIDs (ibuprofen 200mg three tablets three times a day as needed). Please see your primary care physician for further refills.  RTC: PRN; call if any issues.   Schedule with ORTHO    Eleuterio Salmeron D.O.  Sports Medicine Fellow

## 2023-11-21 NOTE — PROGRESS NOTES
Faculty Attestation: Bethany Garvey  was seen in [facility] Medicine [facility]. Discussed with Dr. Salmeron at the time of the visit. History of Present Illness, Physical Exam, and Assessment and Plan reviewed. Treatment plan is reasonable and appropriate. Compliance with treatment recommendations is important.  Radiology images independently reviewed and agree with radiologist interpretation. No procedure was performed.     Ama Veras MD  Sports Medicine

## (undated) DEVICE — ELECTRODE PATIENT RETURN DISP

## (undated) DEVICE — APPLICATOR STRL COT 2INNR 6IN

## (undated) DEVICE — APPLICATOR CHLORAPREP ORN 26ML

## (undated) DEVICE — SEE MEDLINE ITEM 154981

## (undated) DEVICE — CABLE BIPOLAR HIGH FREQUENCY

## (undated) DEVICE — ADHESIVE DERMABOND ADVANCED

## (undated) DEVICE — Device

## (undated) DEVICE — DRAPE UTILITY W/ TAPE 20X30IN

## (undated) DEVICE — SOL ELECTROLUBE ANTI-STIC

## (undated) DEVICE — NDL INSUF ULTRA VERESS 120MM

## (undated) DEVICE — CANNULA ENDOPATH XCEL 5X100MM

## (undated) DEVICE — SOL IRRI STRL WATER 1000ML

## (undated) DEVICE — NDL SAFETY 22G X 1.5 ECLIPSE

## (undated) DEVICE — NDL HYPODERMIC BLUNT 18G 1.5IN

## (undated) DEVICE — TROCAR ENDOPATH XCEL 5X100MM

## (undated) DEVICE — SUT MCRYL PLUS 4-0 PS2 27IN

## (undated) DEVICE — GLOVE PROTEXIS HYDROGEL SZ7.5

## (undated) DEVICE — PACK GYN LAPAROSCOPY HZD

## (undated) DEVICE — JELLY SURGILUBE LUBE TUBE 2OZ